# Patient Record
Sex: MALE | URBAN - METROPOLITAN AREA
[De-identification: names, ages, dates, MRNs, and addresses within clinical notes are randomized per-mention and may not be internally consistent; named-entity substitution may affect disease eponyms.]

---

## 2024-05-01 ENCOUNTER — SOCIAL WORK (OUTPATIENT)
Dept: ADMINISTRATIVE | Facility: OTHER | Age: 29
End: 2024-05-01

## 2024-05-01 NOTE — PROGRESS NOTES
[facility] received a referral to assist with autism testing. [facility] received a consult to see Patient. However, the Clinic does not have a psychologist on staff to do testing. Sw mailed Patient a letter explaining this. He was encouraged to contact his insurance carrier to inquire if there are any local in-network psychologists he could see that does neuropsych [email or web address]    Jessica Macias LCSW    [phone]